# Patient Record
Sex: FEMALE | Race: BLACK OR AFRICAN AMERICAN | Employment: OTHER | URBAN - METROPOLITAN AREA
[De-identification: names, ages, dates, MRNs, and addresses within clinical notes are randomized per-mention and may not be internally consistent; named-entity substitution may affect disease eponyms.]

---

## 2017-02-28 ENCOUNTER — NURSE ONLY (OUTPATIENT)
Dept: CARDIOLOGY CLINIC | Age: 82
End: 2017-02-28

## 2017-02-28 DIAGNOSIS — R00.1 BRADYCARDIA: ICD-10-CM

## 2017-02-28 DIAGNOSIS — Z95.0 CARDIAC PACEMAKER IN SITU: Primary | ICD-10-CM

## 2017-02-28 DIAGNOSIS — I48.0 PAF (PAROXYSMAL ATRIAL FIBRILLATION) (HCC): ICD-10-CM

## 2017-02-28 PROCEDURE — 93296 REM INTERROG EVL PM/IDS: CPT | Performed by: INTERNAL MEDICINE

## 2017-02-28 PROCEDURE — 93294 REM INTERROG EVL PM/LDLS PM: CPT | Performed by: INTERNAL MEDICINE

## 2017-03-01 PROBLEM — I48.0 PAF (PAROXYSMAL ATRIAL FIBRILLATION) (HCC): Status: ACTIVE | Noted: 2017-03-01

## 2017-03-15 ENCOUNTER — PROCEDURE VISIT (OUTPATIENT)
Dept: CARDIOLOGY CLINIC | Age: 82
End: 2017-03-15

## 2017-03-15 ENCOUNTER — OFFICE VISIT (OUTPATIENT)
Dept: CARDIOLOGY CLINIC | Age: 82
End: 2017-03-15

## 2017-03-15 VITALS
WEIGHT: 188.6 LBS | BODY MASS INDEX: 35.61 KG/M2 | DIASTOLIC BLOOD PRESSURE: 84 MMHG | HEART RATE: 84 BPM | RESPIRATION RATE: 16 BRPM | HEIGHT: 61 IN | SYSTOLIC BLOOD PRESSURE: 136 MMHG

## 2017-03-15 DIAGNOSIS — Z95.0 CARDIAC PACEMAKER IN SITU: ICD-10-CM

## 2017-03-15 DIAGNOSIS — I48.0 PAF (PAROXYSMAL ATRIAL FIBRILLATION) (HCC): ICD-10-CM

## 2017-03-15 DIAGNOSIS — R00.1 BRADYCARDIA: Primary | ICD-10-CM

## 2017-03-15 DIAGNOSIS — R00.1 BRADYCARDIA: ICD-10-CM

## 2017-03-15 DIAGNOSIS — F03.90 DEMENTIA WITHOUT BEHAVIORAL DISTURBANCE, UNSPECIFIED DEMENTIA TYPE: ICD-10-CM

## 2017-03-15 DIAGNOSIS — Z95.0 PACEMAKER: ICD-10-CM

## 2017-03-15 PROCEDURE — 93280 PM DEVICE PROGR EVAL DUAL: CPT | Performed by: INTERNAL MEDICINE

## 2017-03-15 PROCEDURE — 99214 OFFICE O/P EST MOD 30 MIN: CPT | Performed by: NURSE PRACTITIONER

## 2017-03-15 RX ORDER — BUDESONIDE AND FORMOTEROL FUMARATE DIHYDRATE 160; 4.5 UG/1; UG/1
2 AEROSOL RESPIRATORY (INHALATION) 2 TIMES DAILY
COMMUNITY

## 2017-04-03 DIAGNOSIS — I48.0 PAF (PAROXYSMAL ATRIAL FIBRILLATION) (HCC): ICD-10-CM

## 2017-04-03 DIAGNOSIS — R00.1 BRADYCARDIA: ICD-10-CM

## 2017-04-04 LAB
ANION GAP SERPL CALCULATED.3IONS-SCNC: 16 MMOL/L (ref 3–16)
BUN BLDV-MCNC: 15 MG/DL (ref 7–20)
CALCIUM SERPL-MCNC: 9.5 MG/DL (ref 8.3–10.6)
CHLORIDE BLD-SCNC: 100 MMOL/L (ref 99–110)
CO2: 27 MMOL/L (ref 21–32)
CREAT SERPL-MCNC: 1.5 MG/DL (ref 0.6–1.2)
GFR AFRICAN AMERICAN: 40
GFR NON-AFRICAN AMERICAN: 33
GLUCOSE BLD-MCNC: 91 MG/DL (ref 70–99)
POTASSIUM SERPL-SCNC: 4 MMOL/L (ref 3.5–5.1)
SODIUM BLD-SCNC: 143 MMOL/L (ref 136–145)
TSH SERPL DL<=0.05 MIU/L-ACNC: 1.97 UIU/ML (ref 0.27–4.2)

## 2017-04-11 ENCOUNTER — TELEPHONE (OUTPATIENT)
Dept: CARDIOLOGY CLINIC | Age: 82
End: 2017-04-11

## 2017-04-19 DIAGNOSIS — R79.89 ELEVATED SERUM CREATININE: Primary | ICD-10-CM

## 2017-05-02 ENCOUNTER — TELEPHONE (OUTPATIENT)
Dept: CARDIOLOGY | Age: 82
End: 2017-05-02

## 2017-05-11 ENCOUNTER — NURSE ONLY (OUTPATIENT)
Dept: CARDIOLOGY CLINIC | Age: 82
End: 2017-05-11

## 2017-05-11 DIAGNOSIS — I48.0 PAF (PAROXYSMAL ATRIAL FIBRILLATION) (HCC): Primary | ICD-10-CM

## 2017-06-20 ENCOUNTER — PROCEDURE VISIT (OUTPATIENT)
Dept: CARDIOLOGY CLINIC | Age: 82
End: 2017-06-20

## 2017-06-20 ENCOUNTER — OFFICE VISIT (OUTPATIENT)
Dept: CARDIOLOGY CLINIC | Age: 82
End: 2017-06-20

## 2017-06-20 VITALS
RESPIRATION RATE: 16 BRPM | WEIGHT: 187.8 LBS | HEART RATE: 81 BPM | BODY MASS INDEX: 35.45 KG/M2 | DIASTOLIC BLOOD PRESSURE: 76 MMHG | SYSTOLIC BLOOD PRESSURE: 138 MMHG | HEIGHT: 61 IN

## 2017-06-20 DIAGNOSIS — Z95.0 CARDIAC PACEMAKER IN SITU: ICD-10-CM

## 2017-06-20 DIAGNOSIS — I48.0 PAF (PAROXYSMAL ATRIAL FIBRILLATION) (HCC): Primary | ICD-10-CM

## 2017-06-20 DIAGNOSIS — R00.1 BRADYCARDIA: ICD-10-CM

## 2017-06-20 PROCEDURE — 99214 OFFICE O/P EST MOD 30 MIN: CPT | Performed by: INTERNAL MEDICINE

## 2017-06-20 PROCEDURE — 93280 PM DEVICE PROGR EVAL DUAL: CPT | Performed by: INTERNAL MEDICINE

## 2017-06-26 RX ORDER — LOSARTAN POTASSIUM AND HYDROCHLOROTHIAZIDE 12.5; 5 MG/1; MG/1
1 TABLET ORAL DAILY
Qty: 90 TABLET | Refills: 3 | Status: SHIPPED | OUTPATIENT
Start: 2017-06-26 | End: 2018-02-06 | Stop reason: CLARIF

## 2017-07-21 ENCOUNTER — OFFICE VISIT (OUTPATIENT)
Dept: CARDIOLOGY CLINIC | Age: 82
End: 2017-07-21

## 2017-07-21 VITALS
WEIGHT: 186.8 LBS | BODY MASS INDEX: 35.3 KG/M2 | SYSTOLIC BLOOD PRESSURE: 130 MMHG | HEART RATE: 63 BPM | DIASTOLIC BLOOD PRESSURE: 70 MMHG

## 2017-07-21 DIAGNOSIS — Z95.0 CARDIAC PACEMAKER IN SITU: ICD-10-CM

## 2017-07-21 DIAGNOSIS — I48.0 PAF (PAROXYSMAL ATRIAL FIBRILLATION) (HCC): ICD-10-CM

## 2017-07-21 DIAGNOSIS — I10 ESSENTIAL HYPERTENSION: Primary | ICD-10-CM

## 2017-07-21 PROCEDURE — 99214 OFFICE O/P EST MOD 30 MIN: CPT | Performed by: INTERNAL MEDICINE

## 2017-07-21 RX ORDER — AMLODIPINE BESYLATE 10 MG/1
10 TABLET ORAL DAILY
Qty: 90 TABLET | Refills: 3 | Status: SHIPPED | OUTPATIENT
Start: 2017-07-21 | End: 2018-07-01 | Stop reason: SDUPTHER

## 2017-09-19 ENCOUNTER — NURSE ONLY (OUTPATIENT)
Dept: CARDIOLOGY CLINIC | Age: 82
End: 2017-09-19

## 2017-09-19 DIAGNOSIS — R00.1 BRADYCARDIA: ICD-10-CM

## 2017-09-19 DIAGNOSIS — Z95.0 CARDIAC PACEMAKER IN SITU: ICD-10-CM

## 2017-09-19 PROCEDURE — 93294 REM INTERROG EVL PM/LDLS PM: CPT | Performed by: INTERNAL MEDICINE

## 2017-09-19 PROCEDURE — 93296 REM INTERROG EVL PM/IDS: CPT | Performed by: INTERNAL MEDICINE

## 2017-10-12 NOTE — TELEPHONE ENCOUNTER
Medication: metoprolol tartrate    Strength: 25mg     Directions: 1 tab twice a day    Last visit :  07/21/2017    Next Visit : 10/24/2017    Last fill: 06/20/2017

## 2017-10-24 ENCOUNTER — OFFICE VISIT (OUTPATIENT)
Dept: CARDIOLOGY CLINIC | Age: 82
End: 2017-10-24

## 2017-10-24 VITALS
WEIGHT: 180.6 LBS | SYSTOLIC BLOOD PRESSURE: 130 MMHG | BODY MASS INDEX: 34.12 KG/M2 | HEART RATE: 75 BPM | DIASTOLIC BLOOD PRESSURE: 70 MMHG

## 2017-10-24 DIAGNOSIS — I48.0 PAF (PAROXYSMAL ATRIAL FIBRILLATION) (HCC): ICD-10-CM

## 2017-10-24 DIAGNOSIS — I10 ESSENTIAL HYPERTENSION: Primary | ICD-10-CM

## 2017-10-24 DIAGNOSIS — Z95.0 CARDIAC PACEMAKER IN SITU: ICD-10-CM

## 2017-10-24 PROCEDURE — 99214 OFFICE O/P EST MOD 30 MIN: CPT | Performed by: NURSE PRACTITIONER

## 2017-10-24 NOTE — PROGRESS NOTES
Lungs clear. No pneumothorax     7/2015 Echo:  Normal left ventricle size and wall thickness. Low normal left ventricular   systolic function with an estimated ejection fraction of 50 %. No regional   wall motion abnormalities are seen. There is reversal of E/A inflow   velocities across the mitral valve suggesting impaired left ventricular relaxation.   Mild to moderate mitral regurgitation.   Mild pulmonic and tricuspid regurgitation. RVSP 46 mmHg.   Similar to prior echo on 07/18/11.    7/2011 Nuc stress:          1. No evidence of ischemia or infarction. 2. Normal left ventricular wall motion. 3. Normal left ventricular ejection fraction of over 50%. Medications:   Current Outpatient Prescriptions   Medication Sig Dispense Refill    metoprolol tartrate (LOPRESSOR) 25 MG tablet TAKE 1 TABLET BY MOUTH TWICE DAILY 180 tablet 3    amLODIPine (NORVASC) 10 MG tablet Take 1 tablet by mouth daily 90 tablet 3    losartan-hydrochlorothiazide (HYZAAR) 50-12.5 MG per tablet Take 1 tablet by mouth daily 90 tablet 3    budesonide-formoterol (SYMBICORT) 160-4.5 MCG/ACT AERO Inhale 2 puffs into the lungs 2 times daily      Ipratropium Bromide HFA (ATROVENT HFA IN) Inhale into the lungs      memantine (NAMENDA XR) 28 MG CP24 capsule Take  by mouth daily.  mometasone (NASONEX) 50 MCG/ACT nasal spray 2 sprays by Nasal route daily. No current facility-administered medications for this visit. Assessment:  1. Essential hypertension     2. PAF (paroxysmal atrial fibrillation) ([facility])     3. Cardiac pacemaker in situ       Plan:  -Cont metoprolol, amlodipine, losartan/HCTZ  -Follow up with Dr. Elvin Rosario in 3 months.

## 2018-01-02 ENCOUNTER — NURSE ONLY (OUTPATIENT)
Dept: CARDIOLOGY CLINIC | Age: 83
End: 2018-01-02

## 2018-01-02 DIAGNOSIS — R00.1 BRADYCARDIA: ICD-10-CM

## 2018-01-02 DIAGNOSIS — Z95.0 CARDIAC PACEMAKER IN SITU: ICD-10-CM

## 2018-01-02 PROCEDURE — 93296 REM INTERROG EVL PM/IDS: CPT | Performed by: INTERNAL MEDICINE

## 2018-01-02 PROCEDURE — 93294 REM INTERROG EVL PM/LDLS PM: CPT | Performed by: INTERNAL MEDICINE

## 2018-03-26 ENCOUNTER — OFFICE VISIT (OUTPATIENT)
Dept: CARDIOLOGY CLINIC | Age: 83
End: 2018-03-26

## 2018-03-26 ENCOUNTER — PROCEDURE VISIT (OUTPATIENT)
Dept: CARDIOLOGY CLINIC | Age: 83
End: 2018-03-26

## 2018-03-26 VITALS
BODY MASS INDEX: 33.14 KG/M2 | WEIGHT: 175.4 LBS | SYSTOLIC BLOOD PRESSURE: 128 MMHG | DIASTOLIC BLOOD PRESSURE: 66 MMHG | HEART RATE: 64 BPM

## 2018-03-26 DIAGNOSIS — I49.5 SSS (SICK SINUS SYNDROME) (HCC): ICD-10-CM

## 2018-03-26 DIAGNOSIS — I48.0 PAROXYSMAL ATRIAL FIBRILLATION (HCC): ICD-10-CM

## 2018-03-26 DIAGNOSIS — Z95.0 CARDIAC PACEMAKER IN SITU: ICD-10-CM

## 2018-03-26 DIAGNOSIS — Z95.0 PACEMAKER: ICD-10-CM

## 2018-03-26 DIAGNOSIS — R00.1 BRADYCARDIA: Primary | ICD-10-CM

## 2018-03-26 DIAGNOSIS — R00.1 BRADYCARDIA: ICD-10-CM

## 2018-03-26 PROCEDURE — 99214 OFFICE O/P EST MOD 30 MIN: CPT | Performed by: NURSE PRACTITIONER

## 2018-03-26 PROCEDURE — 93280 PM DEVICE PROGR EVAL DUAL: CPT | Performed by: INTERNAL MEDICINE

## 2018-03-26 NOTE — PROGRESS NOTES
Interrogation and programming evaluation of the device shows normal function, with stable sensing and pacing thresholds. See interrogation for details. The pt saw Rhett Black NP today. Recheck 3 mos.

## 2018-03-26 NOTE — PROGRESS NOTES
ms sensing 5 mV  RV lead: Impedance: 988 Pacing threshold: 2 @ 1.5 ms sensing 12 mV  Historical high RV threshold. Stable. Patient Active Problem List    Diagnosis Date Noted    PAF (paroxysmal atrial fibrillation) ([facility] [address] 03/01/2017     Priority: Low    Cardiac pacemaker in situ 07/14/2015     Priority: Low    Bradycardia 07/14/2015     Priority: Low    Chest pain 07/17/2011     Priority: Low        Assessment:   1. Bradycardia    2. SSS (sick sinus syndrome) ([facility] [address]    3. Pacemaker    4. Paroxysmal atrial fibrillation [facility])        Cardiac Hx: 81 y.o. woman, former pt of Dr Ryley Govae, with a h/o HTN, asthma, PAF (not on coumadin - taken off by Dr. Bryan Tijerina recently), Bell's palsy, SSS, hx of intermittent AV block, s/p dual chamber SJM PPM (originally 1990s, with two prior generator changes and lasty generator change in 2006) who originally presented as a referral from Dr. Bryan Tijerina for device at [facility]. Patient underwent device change out on 7/24/2015. CHADSVASC 4. TSH 1.97 (4/3/2017)      SSS/bradycarda  - Status post dual-chamber SJM PPM.  (original implant 1993, last gen change 2015). Atrial lead is an [employer] implanted in 1993. Atrial lead noise has been noted in the past. None today. Chronically high LV capture threshold at 1 to 1.5.   - Device check today shows 12% atrially paced and < 1%ventricularly paced. Short episodes of atrial tach were noted. 1 HVR noted - SVT vs VT - retrograde conduction. Other parameters are stable. AF  - < 3 seconds noted on device  - Long discussion previously regarding anticoagulation and patient and brother were not interested. - BP had been elevated previously - now brother/dauighter has set up a medication dispensing machine and meds are being taken on a regular basis.    - ECG ordered and results personally reviewed       EF of 49-62%  No known systolic HF  No known CAD  No anticoagulation for AF -per patient and family    All questions and concerns were

## 2018-09-13 ENCOUNTER — OFFICE VISIT (OUTPATIENT)
Dept: CARDIOLOGY CLINIC | Age: 83
End: 2018-09-13

## 2018-09-13 VITALS
OXYGEN SATURATION: 94 % | WEIGHT: 162 LBS | BODY MASS INDEX: 30.61 KG/M2 | DIASTOLIC BLOOD PRESSURE: 82 MMHG | HEART RATE: 87 BPM | SYSTOLIC BLOOD PRESSURE: 134 MMHG

## 2018-09-13 DIAGNOSIS — I48.0 PAF (PAROXYSMAL ATRIAL FIBRILLATION) (HCC): Primary | ICD-10-CM

## 2018-09-13 DIAGNOSIS — Z95.0 CARDIAC PACEMAKER IN SITU: ICD-10-CM

## 2018-09-13 PROCEDURE — 99214 OFFICE O/P EST MOD 30 MIN: CPT | Performed by: INTERNAL MEDICINE

## 2018-09-13 PROCEDURE — 93000 ELECTROCARDIOGRAM COMPLETE: CPT | Performed by: INTERNAL MEDICINE

## 2018-09-13 RX ORDER — ESOMEPRAZOLE MAGNESIUM 40 MG/1
40 FOR SUSPENSION ORAL DAILY
COMMUNITY

## 2018-09-13 RX ORDER — CLOPIDOGREL BISULFATE 75 MG/1
75 TABLET ORAL DAILY
Qty: 90 TABLET | Refills: 3 | Status: SHIPPED | OUTPATIENT
Start: 2018-09-13 | End: 2019-11-13 | Stop reason: SDUPTHER

## 2018-09-13 RX ORDER — QUETIAPINE FUMARATE 25 MG/1
25 TABLET, FILM COATED ORAL NIGHTLY
COMMUNITY

## 2018-09-13 NOTE — PATIENT INSTRUCTIONS
Start taking plavix 75 mg daily. If you notice any bleeding (bowel movement or urine) or weakness or fatigue, let us know immediately.

## 2018-09-13 NOTE — PROGRESS NOTES
across the mitral valve suggesting impaired left ventricular relaxation.   Mild to moderate mitral regurgitation.   Mild pulmonic and tricuspid regurgitation. RVSP 46 mmHg.   Similar to prior echo on 07/18/11.    2011 Nuc stress     1. No evidence of ischemia or infarction. 2. Normal left ventricular wall motion. 3. Normal left ventricular ejection fraction of over 50%. Current Outpatient Prescriptions   Medication Sig Dispense Refill    Memantine HCl-Donepezil HCl (NAMZARIC) 28-10 MG CP24 Take by mouth nightly      HYDROCHLOROTHIAZIDE PO Take by mouth daily      amLODIPine (NORVASC) 10 MG tablet TAKE 1 TABLET BY MOUTH DAILY 90 tablet 1    losartan (COZAAR) 25 MG tablet TAKE 1 TABLET BY MOUTH DAILY 30 tablet 3    metoprolol tartrate (LOPRESSOR) 25 MG tablet TAKE 1 TABLET BY MOUTH TWICE DAILY 180 tablet 3    budesonide-formoterol (SYMBICORT) 160-4.5 MCG/ACT AERO Inhale 2 puffs into the lungs 2 times daily      Ipratropium Bromide HFA (ATROVENT HFA IN) Inhale into the lungs      mometasone (NASONEX) 50 MCG/ACT nasal spray 2 sprays by Nasal route daily.  memantine (NAMENDA XR) 28 MG CP24 capsule Take  by mouth daily. No current facility-administered medications for this visit. Assessment:  1. PAF (paroxysmal atrial fibrillation) ([facility])    2. Cardiac pacemaker in situ        Plan:  -Won't take a/c (from the past), supposedly allergic to aspirin  -Rec plavix 75 q day; no prior bleeding  -Goal, given dementia, is to minimize meds. Will make no other changes.  -See Bianka q 6 months, me yearly or as needed. Per brother, pt has f/u with PPM clinic.  -Notify us for bleeding concerns; written down for them/daughter on the d/c instructions. -Med mgt of heart disease for now.   -Discussed stroke risk with a fib, and that plavix MIGHT mitigate that to some degree    Zac Alexander MD, [facility] [place], [place]

## 2018-10-08 ENCOUNTER — OFFICE VISIT (OUTPATIENT)
Dept: CARDIOLOGY CLINIC | Age: 83
End: 2018-10-08
Payer: MEDICARE

## 2018-10-08 ENCOUNTER — PROCEDURE VISIT (OUTPATIENT)
Dept: CARDIOLOGY CLINIC | Age: 83
End: 2018-10-08
Payer: MEDICARE

## 2018-10-08 VITALS
BODY MASS INDEX: 29.83 KG/M2 | OXYGEN SATURATION: 98 % | DIASTOLIC BLOOD PRESSURE: 64 MMHG | WEIGHT: 158 LBS | HEART RATE: 68 BPM | HEIGHT: 61 IN | RESPIRATION RATE: 14 BRPM | SYSTOLIC BLOOD PRESSURE: 136 MMHG

## 2018-10-08 DIAGNOSIS — I10 ESSENTIAL HYPERTENSION: ICD-10-CM

## 2018-10-08 DIAGNOSIS — I49.5 SSS (SICK SINUS SYNDROME) (HCC): ICD-10-CM

## 2018-10-08 DIAGNOSIS — Z95.0 CARDIAC PACEMAKER IN SITU: ICD-10-CM

## 2018-10-08 DIAGNOSIS — R00.1 BRADYCARDIA: ICD-10-CM

## 2018-10-08 DIAGNOSIS — I48.0 PAF (PAROXYSMAL ATRIAL FIBRILLATION) (HCC): Primary | ICD-10-CM

## 2018-10-08 PROCEDURE — 93280 PM DEVICE PROGR EVAL DUAL: CPT | Performed by: INTERNAL MEDICINE

## 2018-10-08 PROCEDURE — 99214 OFFICE O/P EST MOD 30 MIN: CPT | Performed by: INTERNAL MEDICINE

## 2018-10-08 PROCEDURE — 93000 ELECTROCARDIOGRAM COMPLETE: CPT | Performed by: INTERNAL MEDICINE

## 2018-10-08 NOTE — PROGRESS NOTES
[address]   Cardiac Follow Up      CC:  pAF, SSS, SJM dual chamber PPM (7/24/15)    HPI:  Fay Sampson is a 81 y.o. female, originally a pt of Dr Thaddeus Lino, with a h/o dementia, HTN, asthma, PAF (not on coumadin - taken off by Dr. Ko Gastelum), Bell's palsy, SSS, hx of intermittent AV block, s/p dual chamber SJM PPM (original implant 1990s, with two prior generator changes). Patient underwent generator change of [place] PPM on 7/24/15 (Dr. Dang Short).      Device interrogation today shows normally functioning PPM.  AP 7%,  < 1%. She had 2 episodes of AF since 6/2018, both lasting less than 1 min. No VT noted. Estimated battery longevity about 10 years. She is here today with her brother. She currently lives with her daughter. He remains fairly active with everyday activities. Denies complaints of CP, SOB, dizziness, palpitations, orthopnea, or presycope/syncope. Anticoagulation was not recommended in the past based on the level of her dementia. Past Medical History:   has a past medical history of Asthma; Dementia; DVT (deep venous thrombosis) ([address] H/O: Bell's palsy; Hypertension; and Pacemaker. Surgical History:   has a past surgical history that includes Hysterectomy. Social History:   reports that she has quit smoking. She has never used smokeless tobacco. She reports that she does not drink alcohol or use drugs. Family History:  family history includes Diabetes in her brother, father, and sister; Stroke in her paternal grandmother.      Home Medications:  Outpatient Encounter Prescriptions as of 10/8/2018   Medication Sig Dispense Refill    Memantine HCl-Donepezil HCl (NAMZARIC) 28-10 MG CP24 Take by mouth nightly      HYDROCHLOROTHIAZIDE PO Take by mouth daily      esomeprazole Magnesium (NEXIUM) 40 MG PACK Take 40 mg by mouth daily      QUEtiapine (SEROQUEL) 25 MG tablet Take 25 mg by mouth nightly      clopidogrel (PLAVIX) 75 MG tablet Take 1 tablet by mouth daily

## 2018-11-19 DIAGNOSIS — N18.30 CKD (CHRONIC KIDNEY DISEASE), STAGE III (HCC): ICD-10-CM

## 2018-11-19 LAB
ALBUMIN SERPL-MCNC: 4.4 G/DL (ref 3.4–5)
ANION GAP SERPL CALCULATED.3IONS-SCNC: 21 MMOL/L (ref 3–16)
BUN BLDV-MCNC: 27 MG/DL (ref 7–20)
CALCIUM SERPL-MCNC: 9.9 MG/DL (ref 8.3–10.6)
CHLORIDE BLD-SCNC: 100 MMOL/L (ref 99–110)
CO2: 24 MMOL/L (ref 21–32)
CREAT SERPL-MCNC: 1.5 MG/DL (ref 0.6–1.2)
GFR AFRICAN AMERICAN: 40
GFR NON-AFRICAN AMERICAN: 33
GLUCOSE BLD-MCNC: 85 MG/DL (ref 70–99)
PHOSPHORUS: 4.3 MG/DL (ref 2.5–4.9)
POTASSIUM SERPL-SCNC: 4.6 MMOL/L (ref 3.5–5.1)
SODIUM BLD-SCNC: 145 MMOL/L (ref 136–145)

## 2019-02-13 ENCOUNTER — TELEPHONE (OUTPATIENT)
Dept: CARDIOLOGY CLINIC | Age: 84
End: 2019-02-13

## 2019-02-19 PROBLEM — N18.30 CKD (CHRONIC KIDNEY DISEASE), STAGE III (HCC): Status: ACTIVE | Noted: 2019-02-19

## 2019-03-06 ENCOUNTER — TELEPHONE (OUTPATIENT)
Dept: CARDIOLOGY CLINIC | Age: 84
End: 2019-03-06

## 2019-03-12 ENCOUNTER — NURSE ONLY (OUTPATIENT)
Dept: CARDIOLOGY CLINIC | Age: 84
End: 2019-03-12

## 2019-03-13 ENCOUNTER — PROCEDURE VISIT (OUTPATIENT)
Dept: CARDIOLOGY CLINIC | Age: 84
End: 2019-03-13
Payer: MEDICARE

## 2019-03-13 ENCOUNTER — OFFICE VISIT (OUTPATIENT)
Dept: CARDIOLOGY CLINIC | Age: 84
End: 2019-03-13
Payer: MEDICARE

## 2019-03-13 VITALS
BODY MASS INDEX: 27.42 KG/M2 | SYSTOLIC BLOOD PRESSURE: 130 MMHG | HEIGHT: 62 IN | DIASTOLIC BLOOD PRESSURE: 70 MMHG | WEIGHT: 149 LBS | HEART RATE: 82 BPM | OXYGEN SATURATION: 94 %

## 2019-03-13 DIAGNOSIS — R00.1 BRADYCARDIA: ICD-10-CM

## 2019-03-13 DIAGNOSIS — Z95.0 CARDIAC PACEMAKER IN SITU: ICD-10-CM

## 2019-03-13 DIAGNOSIS — F03.90 DEMENTIA WITHOUT BEHAVIORAL DISTURBANCE, UNSPECIFIED DEMENTIA TYPE: ICD-10-CM

## 2019-03-13 DIAGNOSIS — I10 ESSENTIAL HYPERTENSION: ICD-10-CM

## 2019-03-13 DIAGNOSIS — I48.0 PAROXYSMAL ATRIAL FIBRILLATION (HCC): ICD-10-CM

## 2019-03-13 DIAGNOSIS — Z13.220 LIPID SCREENING: Primary | ICD-10-CM

## 2019-03-13 PROCEDURE — 99214 OFFICE O/P EST MOD 30 MIN: CPT | Performed by: NURSE PRACTITIONER

## 2019-03-13 PROCEDURE — 93296 REM INTERROG EVL PM/IDS: CPT | Performed by: INTERNAL MEDICINE

## 2019-03-13 PROCEDURE — 93294 REM INTERROG EVL PM/LDLS PM: CPT | Performed by: INTERNAL MEDICINE

## 2019-06-20 ENCOUNTER — HOSPITAL ENCOUNTER (EMERGENCY)
Age: 84
Discharge: HOME | End: 2019-06-20
Attending: EMERGENCY MEDICINE
Payer: MEDICARE

## 2019-06-20 ENCOUNTER — APPOINTMENT (OUTPATIENT)
Dept: CT IMAGING | Age: 84
End: 2019-06-20
Payer: MEDICARE

## 2019-06-20 ENCOUNTER — APPOINTMENT (OUTPATIENT)
Dept: GENERAL RADIOLOGY | Age: 84
End: 2019-06-20
Payer: MEDICARE

## 2019-06-20 VITALS
SYSTOLIC BLOOD PRESSURE: 176 MMHG | BODY MASS INDEX: 28.2 KG/M2 | OXYGEN SATURATION: 97 % | DIASTOLIC BLOOD PRESSURE: 84 MMHG | RESPIRATION RATE: 18 BRPM | HEART RATE: 84 BPM | WEIGHT: 154.19 LBS | TEMPERATURE: 97.9 F

## 2019-06-20 DIAGNOSIS — R06.00 DYSPNEA, UNSPECIFIED TYPE: Primary | ICD-10-CM

## 2019-06-20 LAB
ANION GAP SERPL CALCULATED.3IONS-SCNC: 12 MMOL/L (ref 3–16)
BASOPHILS ABSOLUTE: 0 K/UL (ref 0–0.2)
BASOPHILS RELATIVE PERCENT: 0.6 %
BUN BLDV-MCNC: 22 MG/DL (ref 7–20)
CALCIUM SERPL-MCNC: 9.4 MG/DL (ref 8.3–10.6)
CHLORIDE BLD-SCNC: 104 MMOL/L (ref 99–110)
CO2: 29 MMOL/L (ref 21–32)
CREAT SERPL-MCNC: 1.2 MG/DL (ref 0.6–1.2)
D DIMER: 252 NG/ML DDU (ref 0–229)
EOSINOPHILS ABSOLUTE: 0.3 K/UL (ref 0–0.6)
EOSINOPHILS RELATIVE PERCENT: 5.2 %
GFR AFRICAN AMERICAN: 52
GFR NON-AFRICAN AMERICAN: 43
GLUCOSE BLD-MCNC: 91 MG/DL (ref 70–99)
HCT VFR BLD CALC: 37.3 % (ref 36–48)
HEMOGLOBIN: 12 G/DL (ref 12–16)
LV EF: 58 %
LVEF MODALITY: NORMAL
LYMPHOCYTES ABSOLUTE: 1.9 K/UL (ref 1–5.1)
LYMPHOCYTES RELATIVE PERCENT: 28.3 %
MCH RBC QN AUTO: 27.6 PG (ref 26–34)
MCHC RBC AUTO-ENTMCNC: 32.2 G/DL (ref 31–36)
MCV RBC AUTO: 85.9 FL (ref 80–100)
MONOCYTES ABSOLUTE: 0.6 K/UL (ref 0–1.3)
MONOCYTES RELATIVE PERCENT: 9.6 %
NEUTROPHILS ABSOLUTE: 3.8 K/UL (ref 1.7–7.7)
NEUTROPHILS RELATIVE PERCENT: 56.3 %
PDW BLD-RTO: 15.3 % (ref 12.4–15.4)
PLATELET # BLD: 216 K/UL (ref 135–450)
PMV BLD AUTO: 9.4 FL (ref 5–10.5)
POTASSIUM SERPL-SCNC: 4.5 MMOL/L (ref 3.5–5.1)
PRO-BNP: 378 PG/ML (ref 0–449)
RBC # BLD: 4.34 M/UL (ref 4–5.2)
SODIUM BLD-SCNC: 145 MMOL/L (ref 136–145)
TROPONIN: <0.01 NG/ML
WBC # BLD: 6.7 K/UL (ref 4–11)

## 2019-06-20 PROCEDURE — 80048 BASIC METABOLIC PNL TOTAL CA: CPT

## 2019-06-20 PROCEDURE — 93306 TTE W/DOPPLER COMPLETE: CPT

## 2019-06-20 PROCEDURE — 94640 AIRWAY INHALATION TREATMENT: CPT

## 2019-06-20 PROCEDURE — 85379 FIBRIN DEGRADATION QUANT: CPT

## 2019-06-20 PROCEDURE — 83880 ASSAY OF NATRIURETIC PEPTIDE: CPT

## 2019-06-20 PROCEDURE — 93005 ELECTROCARDIOGRAM TRACING: CPT | Performed by: PHYSICIAN ASSISTANT

## 2019-06-20 PROCEDURE — 6360000002 HC RX W HCPCS

## 2019-06-20 PROCEDURE — 94150 VITAL CAPACITY TEST: CPT

## 2019-06-20 PROCEDURE — 99285 EMERGENCY DEPT VISIT HI MDM: CPT

## 2019-06-20 PROCEDURE — 71260 CT THORAX DX C+: CPT

## 2019-06-20 PROCEDURE — 6360000002 HC RX W HCPCS: Performed by: NURSE PRACTITIONER

## 2019-06-20 PROCEDURE — 94761 N-INVAS EAR/PLS OXIMETRY MLT: CPT

## 2019-06-20 PROCEDURE — 99219 PR INITIAL OBSERVATION CARE/DAY 50 MINUTES: CPT | Performed by: INTERNAL MEDICINE

## 2019-06-20 PROCEDURE — 96374 THER/PROPH/DIAG INJ IV PUSH: CPT

## 2019-06-20 PROCEDURE — 96375 TX/PRO/DX INJ NEW DRUG ADDON: CPT

## 2019-06-20 PROCEDURE — 85025 COMPLETE CBC W/AUTO DIFF WBC: CPT

## 2019-06-20 PROCEDURE — 71046 X-RAY EXAM CHEST 2 VIEWS: CPT

## 2019-06-20 PROCEDURE — 84484 ASSAY OF TROPONIN QUANT: CPT

## 2019-06-20 PROCEDURE — 6360000004 HC RX CONTRAST MEDICATION: Performed by: EMERGENCY MEDICINE

## 2019-06-20 PROCEDURE — 6370000000 HC RX 637 (ALT 250 FOR IP): Performed by: NURSE PRACTITIONER

## 2019-06-20 PROCEDURE — 36415 COLL VENOUS BLD VENIPUNCTURE: CPT

## 2019-06-20 RX ORDER — IPRATROPIUM BROMIDE AND ALBUTEROL SULFATE 2.5; .5 MG/3ML; MG/3ML
1 SOLUTION RESPIRATORY (INHALATION) ONCE
Status: COMPLETED | OUTPATIENT
Start: 2019-06-20 | End: 2019-06-20

## 2019-06-20 RX ORDER — ALBUTEROL SULFATE 90 UG/1
2 AEROSOL, METERED RESPIRATORY (INHALATION) EVERY 4 HOURS PRN
Qty: 1 INHALER | Refills: 0 | Status: SHIPPED | OUTPATIENT
Start: 2019-06-20

## 2019-06-20 RX ORDER — FUROSEMIDE 10 MG/ML
20 INJECTION INTRAMUSCULAR; INTRAVENOUS ONCE
Status: COMPLETED | OUTPATIENT
Start: 2019-06-20 | End: 2019-06-20

## 2019-06-20 RX ORDER — FUROSEMIDE 10 MG/ML
INJECTION INTRAMUSCULAR; INTRAVENOUS
Status: COMPLETED
Start: 2019-06-20 | End: 2019-06-20

## 2019-06-20 RX ORDER — PREDNISONE 20 MG/1
60 TABLET ORAL DAILY
Qty: 15 TABLET | Refills: 0 | Status: SHIPPED | OUTPATIENT
Start: 2019-06-20 | End: 2019-06-25

## 2019-06-20 RX ORDER — METHYLPREDNISOLONE SODIUM SUCCINATE 125 MG/2ML
125 INJECTION, POWDER, LYOPHILIZED, FOR SOLUTION INTRAMUSCULAR; INTRAVENOUS ONCE
Status: COMPLETED | OUTPATIENT
Start: 2019-06-20 | End: 2019-06-20

## 2019-06-20 RX ADMIN — IOPAMIDOL 80 ML: 755 INJECTION, SOLUTION INTRAVENOUS at 15:54

## 2019-06-20 RX ADMIN — METHYLPREDNISOLONE SODIUM SUCCINATE 125 MG: 125 INJECTION, POWDER, FOR SOLUTION INTRAMUSCULAR; INTRAVENOUS at 10:23

## 2019-06-20 RX ADMIN — FUROSEMIDE 20 MG: 10 INJECTION INTRAMUSCULAR; INTRAVENOUS at 14:30

## 2019-06-20 RX ADMIN — FUROSEMIDE 20 MG: 10 INJECTION, SOLUTION INTRAMUSCULAR; INTRAVENOUS at 14:30

## 2019-06-20 RX ADMIN — IPRATROPIUM BROMIDE AND ALBUTEROL SULFATE 1 AMPULE: .5; 3 SOLUTION RESPIRATORY (INHALATION) at 10:25

## 2019-06-20 NOTE — ED NOTES
Echo being done at bedside     [facility], [address] [place] [address]  06/20/19 [id number]

## 2019-06-20 NOTE — ED PROVIDER NOTES
[address] ENCOUNTER          PHYSICIAN ASSISTANT NOTE     Date of evaluation: 6/20/2019    ADDENDUM:      Care of this patient was assumed from [facility]. The patient was seen for Shortness of Breath  . The patient's initial evaluation and plan have been discussed with the prior provider who initially evaluated the patient. Nursing Notes, Past Medical Hx, Past Surgical Hx, Social Hx, Allergies, and Family Hx were all reviewed. Upon sign out, pending was results of CTPA due to slightly elevated BNP and per cardiologist's recommendations, and appropriate disposition. Diagnostic Results     RADIOLOGY:  XR CHEST STANDARD (2 VW)   Final Result      No radiographic evidence of an acute cardiopulmonary process.       CT CHEST PULMONARY EMBOLISM W CONTRAST    (Results Pending)       LABS:   Results for orders placed or performed during the hospital encounter of 06/20/19   CBC auto differential   Result Value Ref Range    WBC 6.7 4.0 - 11.0 K/uL    RBC 4.34 4.00 - 5.20 M/uL    Hemoglobin 12.0 12.0 - 16.0 g/dL    Hematocrit 37.3 36.0 - 48.0 %    MCV 85.9 80.0 - 100.0 fL    MCH 27.6 26.0 - 34.0 pg    MCHC 32.2 31.0 - 36.0 g/dL    RDW 15.3 12.4 - 15.4 %    Platelets 511 762 - 908 K/uL    MPV 9.4 5.0 - 10.5 fL    Neutrophils % 56.3 %    Lymphocytes % 28.3 %    Monocytes % 9.6 %    Eosinophils % 5.2 %    Basophils % 0.6 %    Neutrophils # 3.8 1.7 - 7.7 K/uL    Lymphocytes # 1.9 1.0 - 5.1 K/uL    Monocytes # 0.6 0.0 - 1.3 K/uL    Eosinophils # 0.3 0.0 - 0.6 K/uL    Basophils # 0.0 0.0 - 0.2 K/uL   Basic Metabolic Panel   Result Value Ref Range    Sodium 145 136 - 145 mmol/L    Potassium 4.5 3.5 - 5.1 mmol/L    Chloride 104 99 - 110 mmol/L    CO2 29 21 - 32 mmol/L    Anion Gap 12 3 - 16    Glucose 91 70 - 99 mg/dL    BUN 22 (H) 7 - 20 mg/dL    CREATININE 1.2 0.6 - 1.2 mg/dL    GFR Non- 43 (A) >60    GFR  52 (A) >60    Calcium 9.4 8.3 - 10.6 mg/dL Troponin   Result Value Ref Range    Troponin <0.01 <0.01 ng/mL   Brain Natriuretic Peptide   Result Value Ref Range    Pro- 0 - 449 pg/mL   D-dimer, quantitative (Lab)   Result Value Ref Range    D-Dimer, Quant 252 (H) 0 - 229 ng/mL DDU       RECENT VITALS:  BP: (!) 160/71, Temp: 97.9 °F (36.6 °C), Pulse: 85, Resp: 19, SpO2: 97 %     Procedures       ED Course     The patient was given the following medications:  Orders Placed This Encounter   Medications    ipratropium-albuterol (DUONEB) nebulizer solution 1 ampule    methylPREDNISolone sodium (SOLU-MEDROL) injection 125 mg    furosemide (LASIX) injection 20 mg    furosemide (LASIX) 10 MG/ML injection     RAMSES WHITEHEAD: cabinet override    iopamidol (ISOVUE-370) 76 % injection 80 mL       CONSULTS:  [facility],[address] / ASSESSMENT / Jluis Roof is a 81 y.o. female presenting with shortness of breath. CTPA showed no evidence of pulmonary embolism. Patient will be discharged home with albuterol inhaler albuterol inhaler and 5 day course of prednisone, per off-going provider's recommendations. Patient and  were agreeable to plan. Discharge home in stable condition with normal vitals and good follow-up. Patient also evaluated by the attending physician. All care plans were discussed and agreed upon. Clinical Impression     1. Dyspnea, unspecified type        Disposition     PATIENT REFERRED TO:  No follow-up provider specified.     DISCHARGE MEDICATIONS:  New Prescriptions    No medications on file       [address] [facility], [place]  06/20/19 4882

## 2019-06-20 NOTE — FLOWSHEET NOTE
06/20/19 1520   Encounter Summary   Services provided to: Patient and family together   Referral/Consult From: Rounding   Continue Visiting   (Seen 6/20/19, TERRANCE. )   Complexity of Encounter Moderate   Length of Encounter 15 minutes   Routine   Type Initial   Assessment Approachable   Intervention Nurtured hope   Outcome Expressed gratitude

## 2019-06-20 NOTE — CONSULTS
81 y.o. woman here for cc of sob. Has h/o parox a fib (no a/c d/t dementia, seen by Dr. Janee Alamo in the past). Has SSS with PPM and HTN. Had sob that started today. Patient has difficulty explaining what sx felt like (again, has dementia). Had felt that way this morning, no sob prior to today. No chest pain. No n/v/LH/dizziness. No syncope. Denied sob laying flat or PND. Does not feel weak. In ER, had cxr that showed no infiltrate, had normal BNP. May have been wheezing per ER MD, and got a neb. Patient denies sob at this time. Past Medical History:   Diagnosis Date    Asthma     Dementia     [facility] ([facility]     H/O: Bell's palsy     Hypertension     Pacemaker      Past Surgical History:   Procedure Laterality Date    HYSTERECTOMY       Social History     Tobacco Use    Smoking status: Former Smoker    Smokeless tobacco: Never Used   Substance Use Topics    Alcohol use: No    Drug use: No     Family History   Problem Relation Age of Onset    Diabetes Father     Diabetes Sister     Diabetes Brother     Stroke Paternal Grandmother        Review of Systems   General: No fevers, chills, fatigue, or night sweats. HEENT: No blurry or decreased vision. Cardiovascular: See HPI. No cramping in legs or buttocks when walking. Respiratory: +SOB . + history of asthma. Gastrointestinal: No abdominal pain, hematochezia, melana, or history of GI ulcers. Genito-Urinary: No dysuria or hematuria. No urgency or polyuria. Musculoskeletal: No complaints of new joint pain, joint swelling   Neurological: No dizziness or headaches. No numbness/tingling, speech problems or weakness. Psychological: No anxiety or depression  Hematological and Lymphatic: No abnormal bleeding or bruising, blood clots, jaundice. Endocrine: No malaise/lethargy, palpitations, polydipsia/polyuria, temperature intolerance or unexpected weight changes.    Skin: No rashes or non-healing ulcers. Physical Exam:  BP (!) 160/71   Pulse 85   Temp 97.9 °F (36.6 °C) (Oral)   Resp 19   Wt 154 lb 3 oz (69.9 kg)   SpO2 97%   BMI 28.20 kg/m²    General (appearance): Well devel. No distress. Eyes: Anicteric. EOMI  Neck: supple. No jvd  Ears/Nose/Mouth/Thorat: No cyanosis  CV: RRR. No m/r/g   Respiratory:  Clear but diminished ex for bilateral rales. GI: Abd s/nt/nd. No peritoneal signs  Skin: Warm, dry. No rashes  Neuro/Psych: Alert and oriented x 3. Appropriate behavior  Ext:  No c/c. No pitting edema  Pulses:  2+ carotid. No bruits      Lab Results   Component Value Date    WBC 6.7 06/20/2019    HGB 12.0 06/20/2019    HCT 37.3 06/20/2019    MCV 85.9 06/20/2019     06/20/2019       Chemistry        Component Value Date/Time     06/20/2019 1026    K 4.5 06/20/2019 1026     06/20/2019 1026    CO2 29 06/20/2019 1026    BUN 22 (H) 06/20/2019 1026    CREATININE 1.2 06/20/2019 1026        Component Value Date/Time    CALCIUM 9.4 06/20/2019 1026    ALKPHOS 81 04/10/2015 2056    AST 29 04/10/2015 2056    ALT 18 04/10/2015 2056    BILITOT <0.2 04/10/2015 2056        Lab Results   Component Value Date    CHOL 197 01/01/2014    CHOL 207 (H) 09/27/2012     Lab Results   Component Value Date    TRIG 143 01/01/2014    TRIG 190 (H) 09/27/2012     Lab Results   Component Value Date    HDL 50 01/01/2014    HDL 46 09/27/2012     Lab Results   Component Value Date    LDLCALC 118 (H) 01/01/2014    LDLCALC 122 (H) 09/27/2012     Lab Results   Component Value Date    LABVLDL 29 01/01/2014    LABVLDL 38 09/27/2012     No results found for: CHOLHDLRATIO    6/20/2019  ecg: NSR    6/20/2019: Lungs clear    July 2015 Echo   Normal left ventricle size and wall thickness. Low normal left ventricular   systolic function with an estimated ejection fraction of 50 %. No regional   wall motion abnormalities are seen.  There is reversal of E/A inflow   velocities across the mitral valve suggesting impaired left ventricular relaxation.  Sreekanth Fragmin to moderate mitral regurgitation.   Mild pulmonic and tricuspid regurgitation. RVSP 46 mmHg.   Similar to prior echo on 07/18/11.    2011 Nuc stress     1. No evidence of ischemia or infarction. 2. Normal left ventricular wall motion. 3. Normal left ventricular ejection fraction of over 50%. Current Facility-Administered Medications   Medication Dose Route Frequency Provider Last Rate Last Dose    furosemide (LASIX) injection 20 mg  20 mg Intravenous Once Silvestre oRdriguez MD        furosemide (LASIX) 10 MG/ML injection              Current Outpatient Medications   Medication Sig Dispense Refill    metoprolol tartrate (LOPRESSOR) 25 MG tablet TAKE 1 TABLET BY MOUTH TWICE DAILY 180 tablet 1    Memantine HCl-Donepezil HCl (NAMZARIC) 28-10 MG CP24 Take by mouth nightly      HYDROCHLOROTHIAZIDE PO Take 25 mg by mouth daily       esomeprazole Magnesium (NEXIUM) 40 MG PACK Take 40 mg by mouth daily      QUEtiapine (SEROQUEL) 25 MG tablet Take 25 mg by mouth nightly      clopidogrel (PLAVIX) 75 MG tablet Take 1 tablet by mouth daily 90 tablet 3    amLODIPine (NORVASC) 10 MG tablet TAKE 1 TABLET BY MOUTH DAILY 90 tablet 1    losartan (COZAAR) 25 MG tablet TAKE 1 TABLET BY MOUTH DAILY 30 tablet 3    budesonide-formoterol (SYMBICORT) 160-4.5 MCG/ACT AERO Inhale 2 puffs into the lungs 2 times daily      Ipratropium Bromide HFA (ATROVENT HFA IN) Inhale into the lungs      mometasone (NASONEX) 50 MCG/ACT nasal spray 2 sprays by Nasal route daily. Assessment:  81 y.o. who came to ER with sob. Issues:  -Shortness of breath  -Essential HTN  -SSS  -S/P Pacemaker  -Paroxysmal atrial fib  -Dementia    Recs:  -Giving IV lasix x 1 though no sig sob suspected  -Incentive baltazar ordered, suspect atelectasis  -Check D-Dimer.  Has had dvt in past  -Resume losartan, metop, hctz, amlodipine  -Echo  -Have discussed a/c in past, as has EP, and d/t fall risk and dementia we will hold off. Cont antiplt therapy    If all unrevealing and vitals stable, pt likely to go home. If home, should see Venancio Bloom in 1 week. Nikki Dobbs MD, [facility] - [facility]

## 2019-06-20 NOTE — ED PROVIDER NOTES
ED Attending Attestation Note     Date of evaluation: 6/20/2019    This patient was seen by the advance practice provider. I have seen and examined the patient, agree with the workup, evaluation, management and diagnosis. The care plan has been discussed. I have reviewed the ECG and concur with the CHRISTOPHER's interpretation. My assessment reveals rales heard throughout most lung fields more prominent in the bases some mild bilateral lower extremity edema.      Jw Dickey MD  06/20/19 2374

## 2019-06-20 NOTE — ED PROVIDER NOTES
[address] [facility] [address] ENCOUNTER          NURSE PRACTITIONER NOTE       Date of evaluation: 6/20/2019    Chief Complaint     Shortness of Breath      History of Present Illness     Jeanie Naylor is a 81 y.o. female with a past medical history of asthma, dementia, DVT, hypertension, pacemaker presents emergency room with complaints of intermittent difficulty breathing for the past 3-4 days. Symptoms were worsening today. Patient has had a nonproductive cough. There has been no complaints of chest pain. No nausea or vomiting.  and daughter states patient has had some loose stools. Denies fever. Patient ref her inhaler 2 days ago. She is a nonsmoker. Patient is able speak in complete sentences. Patient does have a history of dementia. The  does not know much about her medical history. The daughter does live with the patient. I spoke to her the phone and she had given me the history of the patient. Review of Systems     As noted above, otherwise negative. Past Medical, Surgical, Family, and Social History     She has a past medical history of Asthma, Dementia, DVT (deep venous thrombosis) ([facility] [address] H/O: Bell's palsy, Hypertension, and Pacemaker. She has a past surgical history that includes Hysterectomy. Her family history includes Diabetes in her brother, father, and sister; Stroke in her paternal grandmother. She reports that she has quit smoking. She has never used smokeless tobacco. She reports that she does not drink alcohol or use drugs.     Medications     Previous Medications    AMLODIPINE (NORVASC) 10 MG TABLET    TAKE 1 TABLET BY MOUTH DAILY    BUDESONIDE-FORMOTEROL (SYMBICORT) 160-4.5 MCG/ACT AERO    Inhale 2 puffs into the lungs 2 times daily    CLOPIDOGREL (PLAVIX) 75 MG TABLET    Take 1 tablet by mouth daily    ESOMEPRAZOLE MAGNESIUM (NEXIUM) 40 MG PACK    Take 40 mg by mouth daily    HYDROCHLOROTHIAZIDE PO    Take 25 mg by mouth daily     IPRATROPIUM BROMIDE HFA (ATROVENT HFA IN)    Inhale into the lungs    LOSARTAN (COZAAR) 25 MG TABLET    TAKE 1 TABLET BY MOUTH DAILY    MEMANTINE HCL-DONEPEZIL HCL (NAMZARIC) 28-10 MG CP24    Take by mouth nightly    METOPROLOL TARTRATE (LOPRESSOR) 25 MG TABLET    TAKE 1 TABLET BY MOUTH TWICE DAILY    MOMETASONE (NASONEX) 50 MCG/ACT NASAL SPRAY    2 sprays by Nasal route daily. QUETIAPINE (SEROQUEL) 25 MG TABLET    Take 25 mg by mouth nightly       Allergies     She is allergic to aspirin; celebrex [celecoxib]; codeine; darifenacin; dronedarone; escitalopram oxalate; lisinopril; nsaids; valium [diazepam]; zetia [ezetimibe]; zocor [simvastatin]; and zoloft. Physical Exam     INITIAL VITALS: BP: (!) 178/77, Temp: 97.9 °F (36.6 °C),Pulse: 83, Resp: 22, SpO2: 97 %   Physical Exam    Constitutional: Well-developed, well-nourished, no acute distress, nontoxicin appearance. HEENT:  Normocephalic, Atraumatic, Bilateral external ears normal.  Oropharynx moist. Nose normal.  Neck: Normal range of motion. No tenderness. Supple, no stridor. Eyes: PERRLA, EOMI, conjunctiva normal.  No discharge. Cardiovascular: Normal heart rate, normal sinus rhythm, no murmurs, no rubs, no gallops. Thorax and lungs:No respiratory distress. Scattered wheezes throughout. No focal rales. , no chest tenderness. Abdomen: Soft, nondistended, normal bowelsounds, nontender, no organomegaly, no mass, no rebound, no guarding   Back: Non-tender. No CVA tenderness. Skin: Warm, dry, no erythema, no rash. Musculoskeletal:  Range of motion grossly normal; no major deformities noted  Extremities: Intact distal pulses. Mild bilateral lower extremity edema. No tenderness. Neurologic:  Alert & oriented x 3. Normal motor function, normal sensory function, no focal deficits noted.     Diagnostic Results     EKG   Interpreted in conjunction with emergency department physician Sabrina Connell MD  EKG reveals normal sinus rhythm rate 84 bpm.  No acute Cheryl Arboleda MD  . Nursing notes, past medical history, surgical history, family history and social history were reviewed and addressed in the HPI. All care plans were discussed and agreed upon. Clinical Impression     1. Dyspnea, unspecified type        Disposition     PATIENT REFERRED TO:   No follow-up provider specified.     DISCHARGE MEDICATIONS:  New Prescriptions    No medications on file       [address], TREY - CNP  06/20/19 [id number]

## 2019-06-21 LAB
EKG ATRIAL RATE: 84 BPM
EKG DIAGNOSIS: NORMAL
EKG P AXIS: 79 DEGREES
EKG P-R INTERVAL: 138 MS
EKG Q-T INTERVAL: 372 MS
EKG QRS DURATION: 80 MS
EKG QTC CALCULATION (BAZETT): 439 MS
EKG R AXIS: 71 DEGREES
EKG T AXIS: 59 DEGREES
EKG VENTRICULAR RATE: 84 BPM

## 2019-08-13 ENCOUNTER — NURSE ONLY (OUTPATIENT)
Dept: CARDIOLOGY CLINIC | Age: 84
End: 2019-08-13
Payer: MEDICARE

## 2019-08-13 DIAGNOSIS — Z95.0 CARDIAC PACEMAKER IN SITU: ICD-10-CM

## 2019-08-13 DIAGNOSIS — R00.1 BRADYCARDIA: ICD-10-CM

## 2019-08-13 PROCEDURE — 93294 REM INTERROG EVL PM/LDLS PM: CPT | Performed by: INTERNAL MEDICINE

## 2019-08-13 PROCEDURE — 93296 REM INTERROG EVL PM/IDS: CPT | Performed by: INTERNAL MEDICINE

## 2019-08-22 NOTE — PROGRESS NOTES
Merlin transmission shows normal device function. Battery and lead function stable. No VT, brief PAT. No AF. See Paceart report under cardiology tab. Recheck 3 mos.  mk

## 2019-09-25 ENCOUNTER — OFFICE VISIT (OUTPATIENT)
Dept: CARDIOLOGY CLINIC | Age: 84
End: 2019-09-25
Payer: MEDICARE

## 2019-09-25 VITALS
DIASTOLIC BLOOD PRESSURE: 60 MMHG | SYSTOLIC BLOOD PRESSURE: 136 MMHG | WEIGHT: 142 LBS | OXYGEN SATURATION: 95 % | HEART RATE: 58 BPM | BODY MASS INDEX: 25.97 KG/M2

## 2019-09-25 DIAGNOSIS — F03.90 DEMENTIA WITHOUT BEHAVIORAL DISTURBANCE, UNSPECIFIED DEMENTIA TYPE: ICD-10-CM

## 2019-09-25 DIAGNOSIS — I10 ESSENTIAL HYPERTENSION: ICD-10-CM

## 2019-09-25 DIAGNOSIS — Z13.220 LIPID SCREENING: ICD-10-CM

## 2019-09-25 DIAGNOSIS — I48.0 PAROXYSMAL ATRIAL FIBRILLATION (HCC): Primary | ICD-10-CM

## 2019-09-25 DIAGNOSIS — I48.0 PAF (PAROXYSMAL ATRIAL FIBRILLATION) (HCC): ICD-10-CM

## 2019-09-25 PROCEDURE — 99214 OFFICE O/P EST MOD 30 MIN: CPT | Performed by: INTERNAL MEDICINE

## 2019-09-25 RX ORDER — CLOPIDOGREL BISULFATE 75 MG/1
75 TABLET ORAL DAILY
Qty: 90 TABLET | Refills: 3 | Status: SHIPPED | OUTPATIENT
Start: 2019-09-25

## 2019-09-25 NOTE — Clinical Note
Claudia,Can you change this 6 mo appt to be made with Cira Bazan? Also, she needs EP follow up. Has a pacemaker. Can you make sure she has appt with Juli Parada or Leigh Ann Rodney.

## 2019-11-12 ENCOUNTER — NURSE ONLY (OUTPATIENT)
Dept: CARDIOLOGY CLINIC | Age: 84
End: 2019-11-12

## 2019-11-13 ENCOUNTER — OFFICE VISIT (OUTPATIENT)
Dept: CARDIOLOGY CLINIC | Age: 84
End: 2019-11-13
Payer: MEDICARE

## 2019-11-13 ENCOUNTER — NURSE ONLY (OUTPATIENT)
Dept: CARDIOLOGY CLINIC | Age: 84
End: 2019-11-13
Payer: MEDICARE

## 2019-11-13 VITALS
WEIGHT: 143.2 LBS | BODY MASS INDEX: 26.35 KG/M2 | HEART RATE: 60 BPM | DIASTOLIC BLOOD PRESSURE: 66 MMHG | SYSTOLIC BLOOD PRESSURE: 138 MMHG | HEIGHT: 62 IN

## 2019-11-13 DIAGNOSIS — R00.1 BRADYCARDIA: ICD-10-CM

## 2019-11-13 DIAGNOSIS — Z95.0 CARDIAC PACEMAKER IN SITU: ICD-10-CM

## 2019-11-13 DIAGNOSIS — I10 BENIGN ESSENTIAL HTN: ICD-10-CM

## 2019-11-13 DIAGNOSIS — I49.5 SSS (SICK SINUS SYNDROME) (HCC): ICD-10-CM

## 2019-11-13 DIAGNOSIS — I48.0 PAROXYSMAL ATRIAL FIBRILLATION (HCC): Primary | ICD-10-CM

## 2019-11-13 DIAGNOSIS — R00.1 SINUS BRADYCARDIA: ICD-10-CM

## 2019-11-13 PROCEDURE — 99214 OFFICE O/P EST MOD 30 MIN: CPT | Performed by: NURSE PRACTITIONER

## 2019-11-13 PROCEDURE — 93280 PM DEVICE PROGR EVAL DUAL: CPT | Performed by: INTERNAL MEDICINE

## 2019-11-13 PROCEDURE — 93000 ELECTROCARDIOGRAM COMPLETE: CPT | Performed by: NURSE PRACTITIONER
